# Patient Record
Sex: MALE | Race: WHITE | NOT HISPANIC OR LATINO | Employment: OTHER | URBAN - METROPOLITAN AREA
[De-identification: names, ages, dates, MRNs, and addresses within clinical notes are randomized per-mention and may not be internally consistent; named-entity substitution may affect disease eponyms.]

---

## 2021-02-09 ENCOUNTER — APPOINTMENT (OUTPATIENT)
Dept: GENERAL RADIOLOGY | Facility: CLINIC | Age: 76
End: 2021-02-09
Attending: EMERGENCY MEDICINE
Payer: COMMERCIAL

## 2021-02-09 ENCOUNTER — HOSPITAL ENCOUNTER (EMERGENCY)
Facility: CLINIC | Age: 76
Discharge: HOME | End: 2021-02-09
Attending: EMERGENCY MEDICINE | Admitting: EMERGENCY MEDICINE
Payer: COMMERCIAL

## 2021-02-09 VITALS
TEMPERATURE: 97.8 F | DIASTOLIC BLOOD PRESSURE: 74 MMHG | RESPIRATION RATE: 19 BRPM | HEIGHT: 72 IN | SYSTOLIC BLOOD PRESSURE: 141 MMHG | WEIGHT: 205 LBS | BODY MASS INDEX: 27.77 KG/M2 | OXYGEN SATURATION: 95 % | HEART RATE: 67 BPM

## 2021-02-09 DIAGNOSIS — J45.21 MILD INTERMITTENT ASTHMA WITH EXACERBATION: ICD-10-CM

## 2021-02-09 LAB
ANION GAP SERPL CALCULATED.3IONS-SCNC: 6 MMOL/L (ref 3–14)
BASOPHILS # BLD AUTO: 0 10E9/L (ref 0–0.2)
BASOPHILS NFR BLD AUTO: 0.7 %
BUN SERPL-MCNC: 18 MG/DL (ref 7–30)
CALCIUM SERPL-MCNC: 8.8 MG/DL (ref 8.5–10.1)
CHLORIDE SERPL-SCNC: 101 MMOL/L (ref 94–109)
CO2 SERPL-SCNC: 29 MMOL/L (ref 20–32)
CREAT SERPL-MCNC: 1.19 MG/DL (ref 0.66–1.25)
DIFFERENTIAL METHOD BLD: NORMAL
EOSINOPHIL # BLD AUTO: 0.1 10E9/L (ref 0–0.7)
EOSINOPHIL NFR BLD AUTO: 2.2 %
ERYTHROCYTE [DISTWIDTH] IN BLOOD BY AUTOMATED COUNT: 13.3 % (ref 10–15)
FLUAV RNA RESP QL NAA+PROBE: NEGATIVE
FLUBV RNA RESP QL NAA+PROBE: NEGATIVE
GFR SERPL CREATININE-BSD FRML MDRD: 60 ML/MIN/{1.73_M2}
GLUCOSE SERPL-MCNC: 132 MG/DL (ref 70–99)
HCT VFR BLD AUTO: 41.3 % (ref 40–53)
HGB BLD-MCNC: 13.5 G/DL (ref 13.3–17.7)
IMM GRANULOCYTES # BLD: 0 10E9/L (ref 0–0.4)
IMM GRANULOCYTES NFR BLD: 0.5 %
INTERPRETATION ECG - MUSE: NORMAL
LABORATORY COMMENT REPORT: NORMAL
LYMPHOCYTES # BLD AUTO: 1.8 10E9/L (ref 0.8–5.3)
LYMPHOCYTES NFR BLD AUTO: 30.2 %
MCH RBC QN AUTO: 30 PG (ref 26.5–33)
MCHC RBC AUTO-ENTMCNC: 32.7 G/DL (ref 31.5–36.5)
MCV RBC AUTO: 92 FL (ref 78–100)
MONOCYTES # BLD AUTO: 0.4 10E9/L (ref 0–1.3)
MONOCYTES NFR BLD AUTO: 6.8 %
NEUTROPHILS # BLD AUTO: 3.5 10E9/L (ref 1.6–8.3)
NEUTROPHILS NFR BLD AUTO: 59.6 %
NRBC # BLD AUTO: 0 10*3/UL
NRBC BLD AUTO-RTO: 0 /100
PLATELET # BLD AUTO: 194 10E9/L (ref 150–450)
POTASSIUM SERPL-SCNC: 3.8 MMOL/L (ref 3.4–5.3)
RBC # BLD AUTO: 4.5 10E12/L (ref 4.4–5.9)
RSV RNA SPEC QL NAA+PROBE: NORMAL
SARS-COV-2 RNA RESP QL NAA+PROBE: NEGATIVE
SODIUM SERPL-SCNC: 136 MMOL/L (ref 133–144)
SPECIMEN SOURCE: NORMAL
TROPONIN I SERPL-MCNC: <0.015 UG/L (ref 0–0.04)
WBC # BLD AUTO: 5.9 10E9/L (ref 4–11)

## 2021-02-09 PROCEDURE — 84484 ASSAY OF TROPONIN QUANT: CPT | Performed by: EMERGENCY MEDICINE

## 2021-02-09 PROCEDURE — 85025 COMPLETE CBC W/AUTO DIFF WBC: CPT | Performed by: EMERGENCY MEDICINE

## 2021-02-09 PROCEDURE — C9803 HOPD COVID-19 SPEC COLLECT: HCPCS

## 2021-02-09 PROCEDURE — 93005 ELECTROCARDIOGRAM TRACING: CPT

## 2021-02-09 PROCEDURE — 87636 SARSCOV2 & INF A&B AMP PRB: CPT | Performed by: EMERGENCY MEDICINE

## 2021-02-09 PROCEDURE — 99285 EMERGENCY DEPT VISIT HI MDM: CPT | Mod: 25

## 2021-02-09 PROCEDURE — 250N000012 HC RX MED GY IP 250 OP 636 PS 637: Performed by: EMERGENCY MEDICINE

## 2021-02-09 PROCEDURE — 80048 BASIC METABOLIC PNL TOTAL CA: CPT | Performed by: EMERGENCY MEDICINE

## 2021-02-09 PROCEDURE — 71045 X-RAY EXAM CHEST 1 VIEW: CPT

## 2021-02-09 RX ORDER — SIMVASTATIN 20 MG
20 TABLET ORAL DAILY
COMMUNITY
Start: 2020-10-14

## 2021-02-09 RX ORDER — PREDNISONE 20 MG/1
TABLET ORAL
Qty: 10 TABLET | Refills: 0 | Status: SHIPPED | OUTPATIENT
Start: 2021-02-09

## 2021-02-09 RX ORDER — AMLODIPINE BESYLATE 5 MG/1
5 TABLET ORAL DAILY
COMMUNITY
Start: 2020-10-28

## 2021-02-09 RX ORDER — SERTRALINE HYDROCHLORIDE 100 MG/1
100 TABLET, FILM COATED ORAL DAILY
COMMUNITY
Start: 2020-10-14

## 2021-02-09 RX ORDER — PREDNISONE 20 MG/1
60 TABLET ORAL ONCE
Status: COMPLETED | OUTPATIENT
Start: 2021-02-09 | End: 2021-02-09

## 2021-02-09 RX ORDER — ALBUTEROL SULFATE 90 UG/1
2 AEROSOL, METERED RESPIRATORY (INHALATION) EVERY 6 HOURS PRN
COMMUNITY
Start: 2020-10-14

## 2021-02-09 RX ORDER — TRIAMTERENE/HYDROCHLOROTHIAZID 37.5-25 MG
1 TABLET ORAL DAILY
COMMUNITY
Start: 2020-10-14

## 2021-02-09 RX ADMIN — PREDNISONE 60 MG: 20 TABLET ORAL at 15:07

## 2021-02-09 ASSESSMENT — MIFFLIN-ST. JEOR: SCORE: 1712.87

## 2021-02-09 ASSESSMENT — ENCOUNTER SYMPTOMS
SHORTNESS OF BREATH: 1
WHEEZING: 1
DIZZINESS: 1

## 2021-02-09 NOTE — ED TRIAGE NOTES
ABCs intact. Pt c/o CP, SOB. Pt used his abuterol inhaler x 4 with no relief. Pt BIBA. Pt given neb x1 en route to ER.

## 2021-02-09 NOTE — ED PROVIDER NOTES
History   Chief Complaint:  Shortness of Breath       HPI   Richard Junior is a 75 year old male with history of asthma who presents via EMS with shortness of breath.  Patient reports beginning yesterday he began to have worsening shortness of breath consistent with previous episodes of his asthma.  However today he began to have significant wheezing and difficulty catching his breath.  He used his albuterol rescue inhaler 4 times without significant relief.  Patient was in the basement of his house and walked up the stairs and felt quite dizzy and almost passed out at the top of the stairs because he could not catch his breath.  He had some mild chest pain with this episode as well.  He was able to get himself to a chair without passing out.  At that point he called 911 for further assistance.  In route the patient received a nebulizer treatment in route with almost complete resolution of his symptoms.  By the time the patient arrived to the emergency department he reports that his wheezing and shortness of breath have significantly improved.     Review of Systems   Respiratory: Positive for shortness of breath and wheezing.    Cardiovascular: Positive for chest pain.   Neurological: Positive for dizziness.   All other systems reviewed and are negative.    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    Physical Exam     Patient Vitals for the past 24 hrs:   BP Temp Temp src Pulse Resp SpO2 Height Weight   02/09/21 1500 (!) 141/74 -- -- 67 19 95 % -- --   02/09/21 1445 138/76 -- -- 75 9 96 % -- --   02/09/21 1430 139/75 -- -- 83 16 97 % -- --   02/09/21 1415 138/68 -- -- 70 19 95 % -- --   02/09/21 1345 126/73 -- -- 66 20 97 % -- --   02/09/21 1333 129/89 97.8  F (36.6  C) Oral 68 18 100 % 1.829 m (6') 93 kg (205 lb)       Physical Exam  General: patient sitting up in bed, looks very comfortable   Head: The scalp, face, and head appear normal  Eyes: The pupils are equal, round, and reactive to light. Conjunctivae and sclerae are normal  CV: Regular rate and rhythm   Resp: Faint wheezing bilaterally. Lungs are clear without rales. No respiratory distress.    GI: Abdomen is soft, no rigidity, guarding, or rebound. No distension. No tenderness to palpation in any quadrant.     MS: Normal tone. Joints grossly normal without effusions. No asymmetric leg swelling, calf or thigh tenderness.    Skin: No rash or lesions noted. Normal capillary refill noted  Neuro: Speech is normal and fluent. Face is symmetric. Moving all extremities.   Psych:  Normal affect.  Appropriate interactions.    Emergency Department Course   ECG  ECG taken at 1349, ECG read at 1402  Sinus rhythm with frequent premature ventricular complexes  Otherwise normal ECG   No significant change as compared to prior, dated 11/21/16.  Rate 69 bpm. PA interval 196 ms. QRS duration 90 ms. QT/QTc 434/461 ms. P-R-T axes 41 -2 39.     Imaging:    XR Chest Port 1 View  Unremarkable single view of the chest.  Reading per radiology.    The above imaging workup was performed.     Laboratory:    CBC: WBC 5.9, HGB 13.5,   BMP: Glucose 132(H), GFR Estimate 60(L) o/w WNL (Creatinine 1.19)  Troponin (Collected 1338): <0.015    Symptomatic  COVID-19 and Influenza A/B by PCR Swab: Negative    Emergency Department Course:    Reviewed:  I reviewed nursing notes, vitals and past medical history    Assessments:  1443 I obtained history and examined the patient as noted above.   1549 I rechecked the patient and explained findings.     Interventions:  1507: Prednisone, 60 mg, Oral    Disposition:  The patient was discharged to home.       Impression & Plan     Medical Decision Making:  Richard Junior is a 75 year old male with a PMH of asthma who presents for evaluation of shortness of breath and wheezing consistent with previous asthma exacerbations. Patient received one nebulizer in route with significant improvement of symptoms. Upon initial evaluation, he does not appear acute distress and looks quite well. He only has faint wheezing on exam. A broad differential was considered including foreign body, asthma, pneumonia, bronchitis, reactive airway disease, pneumothorax, cardiac equivalent, viral induced wheezing, allergic phenomena, etc.  The patient was given a dose prednisone in the emergency department. After a period of observation, he remains stable and feels quite well.  There are no signs at this point of any serious etiologies including those mentioned above.  No indication for hospitalization at this time including no hypoxia, no marked increase in respiratory rate, minimal to no retractions. Doubt ACS or PE. COVID and influenza negative. No evidence of Pneumonia.  Supportive outpatient management is indicated, medications for discharge noted above.  Close followup with primary care physician.  Return if increased wheezing, progressive shortness of breath, develops fever greater than 102. All questions were answered and the patient is amenable for discharge at this time.      Covid-19  Richard Junior was evaluated during a global COVID-19 pandemic, which necessitated consideration that the patient might be at risk for infection with the  SARS-CoV-2 virus that causes COVID-19.   Applicable protocols for evaluation were followed during the patient's care.   COVID-19 was considered as part of the patient's evaluation. The plan for testing is:  a test was obtained during this visit.    Diagnosis:    ICD-10-CM    1. Mild intermittent asthma with exacerbation  J45.21        Discharge Medications:  New Prescriptions    PREDNISONE (DELTASONE) 20 MG TABLET    Take two tablets (= 40mg) each day for 5 (five) days       Scribe Disclosure:  Emmanuel ESPINOZA, am serving as a scribe at 1:58 PM on 2/9/2021 to document services personally performed by Herb Camejo MD based on my observations and the provider's statements to me.        Herb Camejo MD  02/09/21 [id number]

## 2022-10-12 ENCOUNTER — HOSPITAL ENCOUNTER (EMERGENCY)
Facility: CLINIC | Age: 77
Discharge: HOME | End: 2022-10-12
Attending: EMERGENCY MEDICINE | Admitting: EMERGENCY MEDICINE
Payer: COMMERCIAL

## 2022-10-12 ENCOUNTER — APPOINTMENT (OUTPATIENT)
Dept: GENERAL RADIOLOGY | Facility: CLINIC | Age: 77
End: 2022-10-12
Attending: EMERGENCY MEDICINE
Payer: COMMERCIAL

## 2022-10-12 ENCOUNTER — APPOINTMENT (OUTPATIENT)
Dept: CARDIOLOGY | Facility: CLINIC | Age: 77
End: 2022-10-12
Attending: EMERGENCY MEDICINE
Payer: COMMERCIAL

## 2022-10-12 VITALS
SYSTOLIC BLOOD PRESSURE: 120 MMHG | DIASTOLIC BLOOD PRESSURE: 68 MMHG | WEIGHT: 195 LBS | OXYGEN SATURATION: 98 % | BODY MASS INDEX: 26.41 KG/M2 | TEMPERATURE: 97.7 F | RESPIRATION RATE: 16 BRPM | HEART RATE: 54 BPM | HEIGHT: 72 IN

## 2022-10-12 DIAGNOSIS — S01.01XA LACERATION OF SCALP, INITIAL ENCOUNTER: ICD-10-CM

## 2022-10-12 DIAGNOSIS — R42 LIGHTHEADEDNESS: ICD-10-CM

## 2022-10-12 DIAGNOSIS — R55 VASOVAGAL SYNCOPE: ICD-10-CM

## 2022-10-12 LAB
ANION GAP SERPL CALCULATED.3IONS-SCNC: 7 MMOL/L (ref 3–14)
ATRIAL RATE - MUSE: 50 BPM
BASOPHILS # BLD AUTO: 0.1 10E3/UL (ref 0–0.2)
BASOPHILS NFR BLD AUTO: 1 %
BUN SERPL-MCNC: 27 MG/DL (ref 7–30)
CALCIUM SERPL-MCNC: 8.7 MG/DL (ref 8.5–10.1)
CHLORIDE BLD-SCNC: 106 MMOL/L (ref 94–109)
CO2 SERPL-SCNC: 26 MMOL/L (ref 20–32)
CREAT SERPL-MCNC: 1.16 MG/DL (ref 0.66–1.25)
D DIMER PPP FEU-MCNC: 0.35 UG/ML FEU (ref 0–0.5)
DIASTOLIC BLOOD PRESSURE - MUSE: NORMAL MMHG
EOSINOPHIL # BLD AUTO: 0.2 10E3/UL (ref 0–0.7)
EOSINOPHIL NFR BLD AUTO: 3 %
ERYTHROCYTE [DISTWIDTH] IN BLOOD BY AUTOMATED COUNT: 13.9 % (ref 10–15)
GFR SERPL CREATININE-BSD FRML MDRD: 66 ML/MIN/1.73M2
GLUCOSE BLD-MCNC: 120 MG/DL (ref 70–99)
HCT VFR BLD AUTO: 33.8 % (ref 40–53)
HGB BLD-MCNC: 11.3 G/DL (ref 13.3–17.7)
IMM GRANULOCYTES # BLD: 0 10E3/UL
IMM GRANULOCYTES NFR BLD: 1 %
INTERPRETATION ECG - MUSE: NORMAL
LYMPHOCYTES # BLD AUTO: 1 10E3/UL (ref 0.8–5.3)
LYMPHOCYTES NFR BLD AUTO: 14 %
MCH RBC QN AUTO: 29.6 PG (ref 26.5–33)
MCHC RBC AUTO-ENTMCNC: 33.4 G/DL (ref 31.5–36.5)
MCV RBC AUTO: 89 FL (ref 78–100)
MONOCYTES # BLD AUTO: 0.5 10E3/UL (ref 0–1.3)
MONOCYTES NFR BLD AUTO: 7 %
NEUTROPHILS # BLD AUTO: 5.4 10E3/UL (ref 1.6–8.3)
NEUTROPHILS NFR BLD AUTO: 74 %
NRBC # BLD AUTO: 0 10E3/UL
NRBC BLD AUTO-RTO: 0 /100
P AXIS - MUSE: 58 DEGREES
PLATELET # BLD AUTO: 160 10E3/UL (ref 150–450)
POTASSIUM BLD-SCNC: 3.5 MMOL/L (ref 3.4–5.3)
PR INTERVAL - MUSE: 222 MS
QRS DURATION - MUSE: 106 MS
QT - MUSE: 476 MS
QTC - MUSE: 433 MS
R AXIS - MUSE: 19 DEGREES
RBC # BLD AUTO: 3.82 10E6/UL (ref 4.4–5.9)
SODIUM SERPL-SCNC: 139 MMOL/L (ref 133–144)
SYSTOLIC BLOOD PRESSURE - MUSE: NORMAL MMHG
T AXIS - MUSE: 42 DEGREES
T4 FREE SERPL-MCNC: 0.84 NG/DL (ref 0.76–1.46)
TROPONIN I SERPL HS-MCNC: 10 NG/L
TSH SERPL DL<=0.005 MIU/L-ACNC: 4.98 MU/L (ref 0.4–4)
VENTRICULAR RATE- MUSE: 50 BPM
WBC # BLD AUTO: 7.2 10E3/UL (ref 4–11)

## 2022-10-12 PROCEDURE — 12001 RPR S/N/AX/GEN/TRNK 2.5CM/<: CPT

## 2022-10-12 PROCEDURE — 84484 ASSAY OF TROPONIN QUANT: CPT | Performed by: EMERGENCY MEDICINE

## 2022-10-12 PROCEDURE — 85025 COMPLETE CBC W/AUTO DIFF WBC: CPT | Performed by: EMERGENCY MEDICINE

## 2022-10-12 PROCEDURE — 99285 EMERGENCY DEPT VISIT HI MDM: CPT | Mod: 25

## 2022-10-12 PROCEDURE — 84439 ASSAY OF FREE THYROXINE: CPT | Performed by: EMERGENCY MEDICINE

## 2022-10-12 PROCEDURE — 84443 ASSAY THYROID STIM HORMONE: CPT | Performed by: EMERGENCY MEDICINE

## 2022-10-12 PROCEDURE — 93225 XTRNL ECG REC<48 HRS REC: CPT

## 2022-10-12 PROCEDURE — 85379 FIBRIN DEGRADATION QUANT: CPT | Performed by: EMERGENCY MEDICINE

## 2022-10-12 PROCEDURE — 93005 ELECTROCARDIOGRAM TRACING: CPT

## 2022-10-12 PROCEDURE — 80048 BASIC METABOLIC PNL TOTAL CA: CPT | Performed by: EMERGENCY MEDICINE

## 2022-10-12 PROCEDURE — 93227 XTRNL ECG REC<48 HR R&I: CPT | Performed by: INTERNAL MEDICINE

## 2022-10-12 PROCEDURE — 36415 COLL VENOUS BLD VENIPUNCTURE: CPT | Performed by: EMERGENCY MEDICINE

## 2022-10-12 PROCEDURE — 71046 X-RAY EXAM CHEST 2 VIEWS: CPT

## 2022-10-12 ASSESSMENT — ACTIVITIES OF DAILY LIVING (ADL)
ADLS_ACUITY_SCORE: 35
ADLS_ACUITY_SCORE: 35

## 2022-10-12 ASSESSMENT — ENCOUNTER SYMPTOMS
NUMBNESS: 0
NAUSEA: 1
HEADACHES: 0
LIGHT-HEADEDNESS: 1
WEAKNESS: 0
DIZZINESS: 1

## 2022-10-12 NOTE — ED PROVIDER NOTES
"  History   Chief Complaint:  Syncope and Fall     The history is provided by the patient.      Richard Junior is a 77 year old male with history of hypertension and hyperlipidemia who presents via EMS from home after a syncopal episode just prior to arrival. The patient was walking upstairs when he felt suddenly lightheaded, nauseous, and dizzy before having a sudden syncopal episode in after urinating. He did hit his left temple/eye on the corner of the vanity at that time and when he awoke, EMS was called and transported him to ED at this time. En route, the patient was slightly hypotensive at 90 systolic with bradycardia in the low 50s. Presently, he endorses pain to the site of his head trauma, but otherwise denies headache along with recent chest pain, leg swelling, numbness, or weakness. He does have history of similar lightheadedness and dizziness over the past several months along with a couple of previous syncopal episodes, each occurring after using the restroom (urinating only). He states that these symptoms improve with rest. Of note, he does have history of asthma \"since I was a little kid\" and notes recent improvement of this with Advair, and consequent less dependence on albuterol. The patient takes daily aspirin but is not anticoagulated.     Review of Systems   Cardiovascular: Negative for chest pain and leg swelling.   Gastrointestinal: Positive for nausea.   Neurological: Positive for dizziness, syncope and light-headedness. Negative for weakness, numbness and headaches.   All other systems reviewed and are negative.    Allergies:  Aspirin  Ibuprofen  Codeine  Lisinopril  NSAIDs    Medications:  Albuterol  Amlodipine  Cholecalciferol  Omeprazole  Prednisone  Sertraline  Advair  Simvastatin  Maxzide  Tramadol    Past Medical History:     Detached retina repair  JAMISON  Primary osteoarthritis  Hypertension  Age-related cataracts  Hemicrania continua  GERD  Right knee DJD  Shoulder impingement " syndrome  Diverticulosis  Erectile dysfunction  Hyperlipidemia  Hypertrophy of prostate with urinary obstruction  Mild intermittent asthma  Impotence of organic origin    Past Surgical History:    Total knee arthroplasty, right  Laparoscopic cholecystectomy  Cataract removal, bilateral    Family History:    Myocardial infarction  Heart disease  Arthritis  Stroke  Macular degeneration  Prostate cancer  Dementia    Social History:  The patient presents to the ED alone.  The patient arrived via EMS.  PCP: [facility], [place]     Physical Exam     Patient Vitals for the past 24 hrs:   BP Temp Temp src Pulse Resp SpO2 Height Weight   10/12/22 0913 -- -- -- -- -- 98 % -- --   10/12/22 0858 120/68 -- -- -- -- 97 % -- --   10/12/22 0848 -- -- -- -- -- 98 % -- --   10/12/22 0838 -- -- -- -- -- 96 % -- --   10/12/22 0828 121/67 -- -- -- -- 96 % -- --   10/12/22 0642 129/73 97.7  F (36.5  C) Oral 54 16 98 % 1.829 m (6') 88.5 kg (195 lb)       Physical Exam  General: Alert and cooperative with exam. Patient in mild distress. Normal mentation.  Head:  2 cm superior scalp laceration  Eyes:  No scleral icterus, PERRL, EOMI   ENT:  The external nose and ears are normal. The oropharynx is normal and without erythema; mucus membranes are moist. Uvula midline, no evidence of deep space infection.  Neck:  Normal range of motion without rigidity.  CV:  Regular rate and rhythm    No pathologic murmur   Resp:  Breath sounds are clear bilaterally    Non-labored, no retractions or accessory muscle use  GI:  Abdomen is soft, no distension, no tenderness. No peritoneal signs  MS:  No lower extremity edema     No midline cervical, thoracic, or lumbar tenderness  Skin:  Warm and dry, No rash or lesions noted.  Neuro: Oriented x 3. No gross motor deficits.    Strength and sensation grossly intact in all 4 extremities.      Cranial nerves 2-12 intact.    GCS: 15    Emergency Department Course   ECG  ECG results from 10/12/22   EKG 12 lead      Value    Systolic Blood Pressure     Diastolic Blood Pressure     Ventricular Rate 50    Atrial Rate 50    FL Interval 222    QRS Duration 106        QTc 433    P Axis 58    R AXIS 19    T Axis 42    Interpretation ECG      Sinus bradycardia with 1st degree A-V block  Incomplete right bundle branch block  Borderline ECG  When compared with ECG of 09-FEB-2021 13:49,  Premature ventricular complexes are no longer Present  Incomplete right bundle branch block is now Present  Confirmed by GENERATED REPORT, COMPUTER ([id number]),  Neli Whiteside ([id number]) on 10/12/2022 6:30:41 AM       Imaging:  Chest XR,  PA & LAT   Final Result   IMPRESSION: Increased atelectasis in both lung bases. No pleural   effusion or pneumothorax. No visible fractures.      RAGHAVENDRA FUENTES MD            SYSTEM ID:  [id number]      Holter Monitor 48 hour Adult Pediatric    (Results Pending)     Report per radiology    Laboratory:  Labs Ordered and Resulted from Time of ED Arrival to Time of ED Departure   BASIC METABOLIC PANEL - Abnormal       Result Value    Sodium 139      Potassium 3.5      Chloride 106      Carbon Dioxide (CO2) 26      Anion Gap 7      Urea Nitrogen 27      Creatinine 1.16      Calcium 8.7      Glucose 120 (*)     GFR Estimate 66     TSH WITH FREE T4 REFLEX - Abnormal    TSH 4.98 (*)    CBC WITH PLATELETS AND DIFFERENTIAL - Abnormal    WBC Count 7.2      RBC Count 3.82 (*)     Hemoglobin 11.3 (*)     Hematocrit 33.8 (*)     MCV 89      MCH 29.6      MCHC 33.4      RDW 13.9      Platelet Count 160      % Neutrophils 74      % Lymphocytes 14      % Monocytes 7      % Eosinophils 3      % Basophils 1      % Immature Granulocytes 1      NRBCs per 100 WBC 0      Absolute Neutrophils 5.4      Absolute Lymphocytes 1.0      Absolute Monocytes 0.5      Absolute Eosinophils 0.2      Absolute Basophils 0.1      Absolute Immature Granulocytes 0.0      Absolute NRBCs 0.0     D DIMER QUANTITATIVE - Normal    D-Dimer Quantitative 0.35      TROPONIN I - Normal    Troponin I High Sensitivity 10     T4 FREE - Normal    Free T4 0.84        Procedures     Laceration Repair      LACERATION:  A simple clean 2 cm laceration.    LOCATION:  Superior scalp.    FUNCTION:  Distally sensation, circulation, motor and tendon function are intact.      ANESTHESIA:  Local using 0.5% Bupivacaine total of 2 mLs.    PREPARATION:  Irrigation with Normal Saline and Shur Clens.    DEBRIDEMENT:  No debridement.    CLOSURE:  Wound was closed with 3 Staples.    Emergency Department Course:      Reviewed:  I reviewed nursing notes, vitals, past medical history and Care Everywhere.    Assessments:  0602 I obtained history and examined the patient as noted above.   0754 I rechecked the patient and explained findings.  0811 I rechecked the patient and explained findings. I performed laceration repair (see procedure note above). I believe that they are safe for discharge at this time.     Disposition:  The patient was discharged to home.     Impression & Plan     Medical Decision Making:    Richard Junior is a 77 year old male who presents with a history and clinical exam consistent with vasovagal syncope.  While a vasovagal episode was the most likely etiology given the history of this patient, I considered a broad differential for their syncope today including cardiac arrhythmia, ACS, aortic stenosis, HOCM, PE, orthostatic hypotension, drugs, situational, carotid hypersensitivity, seizure, TIA, stroke, vasovagal.   He has no signs of a concerning etiology for syncope at this point.  In addition,he has no family history of sudden death, no chest pain, no seizure activity or post-ictal period, no murmur, and no signs of orthostasis in the ED, no focal neurologic symptoms, and no complaints of concerning headache.  Given recent episodes of lightheadedness, patient was discharged on 48-hour Holter monitor.  Additionally he sustained a small scalp laceration that was repaired per  procedure note above.  Patient is neuro intact and there is no evidence of other significant head injury; he is not anticoagulated.  No emergent indication for head CT at this time.  The workup in the ED is negative and the physical exam is re-assuring.  Supportive outpatient management is therefore indicated.  Recommended close follow-up with PCP in 1 week for staple removal and reevaluation.    Diagnosis:    ICD-10-CM    1. Vasovagal syncope  R55 Holter Monitor 48 hour Adult Pediatric     Holter Monitor 48 hour Adult Pediatric     Holter Monitor 48 hour Adult Pediatric      2. Laceration of scalp, initial encounter  S01.01XA       3. Lightheadedness  R42 Holter Monitor 48 hour Adult Pediatric     Holter Monitor 48 hour Adult Pediatric     Holter Monitor 48 hour Adult Pediatric        Scribe Disclosure:  IKellee, am serving as a scribe at 6:04 AM on 10/12/2022 to document services personally performed by Herb Carlos DO based on my observations and the provider's statements to me.     Herb Carlos DO  10/12/22 [id number]

## 2022-10-12 NOTE — ED NOTES
Bed: ST02  Expected date: 10/12/22  Expected time: 6:00 AM  Means of arrival: Ambulance  Comments:  [facility] 77M fall, head strike

## 2022-10-12 NOTE — ED TRIAGE NOTES
Patient comes in via EMS from home.  He has had dizziness and nausea over the last month or so.  He had a syncopal episode after walking up the stairs today.  Left eye laceration.  Denies any blood thinners.  SBP 90s and HR 50s.